# Patient Record
Sex: FEMALE | Race: WHITE | NOT HISPANIC OR LATINO
[De-identification: names, ages, dates, MRNs, and addresses within clinical notes are randomized per-mention and may not be internally consistent; named-entity substitution may affect disease eponyms.]

---

## 2022-02-13 ENCOUNTER — RX ONLY (OUTPATIENT)
Age: 57
Setting detail: RX ONLY
End: 2022-02-13

## 2023-02-13 ENCOUNTER — RX ONLY (OUTPATIENT)
Age: 58
Setting detail: RX ONLY
End: 2023-02-13

## 2023-11-27 ENCOUNTER — APPOINTMENT (RX ONLY)
Dept: URBAN - METROPOLITAN AREA CLINIC 166 | Facility: CLINIC | Age: 58
Setting detail: DERMATOLOGY
End: 2023-11-27

## 2023-11-27 DIAGNOSIS — Z41.9 ENCOUNTER FOR PROCEDURE FOR PURPOSES OTHER THAN REMEDYING HEALTH STATE, UNSPECIFIED: ICD-10-CM

## 2023-11-27 PROCEDURE — ? COSMETIC FOLLOW-UP

## 2023-11-27 NOTE — PROCEDURE: COSMETIC FOLLOW-UP
Detail Level: Zone
Comments (Free Text): Patient presents for Botox and filler f/u. She states her left brow is slightly lower and she may want more medial cheek filler. \\n\\nPlan: \\n- Left anterior aspect of brow is slightly lower than right brow. Patient wishing for more of a brow lift. After discussion. Plan is to wait until her 3 month retreatment. No Botox today. \\n- [place] temples with sculptra in 3 months \\n- Add more filler to medial cheeks. We will not need the entire syringe. We will treat the chin with whatever is left from the cheek filler. \\n\\nPatient agreeable to plan. She also is wanting improvement in skin laxity. Profound microneedling recommended. Info pamphlet given and consultation with  rec.

## 2024-02-13 ENCOUNTER — APPOINTMENT (RX ONLY)
Dept: URBAN - METROPOLITAN AREA CLINIC 166 | Facility: CLINIC | Age: 59
Setting detail: DERMATOLOGY
End: 2024-02-13

## 2024-02-13 DIAGNOSIS — L81.4 OTHER MELANIN HYPERPIGMENTATION: ICD-10-CM

## 2024-02-13 DIAGNOSIS — L57.8 OTHER SKIN CHANGES DUE TO CHRONIC EXPOSURE TO NONIONIZING RADIATION: ICD-10-CM

## 2024-02-13 DIAGNOSIS — Z71.89 OTHER SPECIFIED COUNSELING: ICD-10-CM

## 2024-02-13 DIAGNOSIS — D18.0 HEMANGIOMA: ICD-10-CM

## 2024-02-13 DIAGNOSIS — D22 MELANOCYTIC NEVI: ICD-10-CM

## 2024-02-13 DIAGNOSIS — L82.1 OTHER SEBORRHEIC KERATOSIS: ICD-10-CM

## 2024-02-13 DIAGNOSIS — B35.1 TINEA UNGUIUM: ICD-10-CM | Status: INADEQUATELY CONTROLLED

## 2024-02-13 PROBLEM — D22.5 MELANOCYTIC NEVI OF TRUNK: Status: ACTIVE | Noted: 2024-02-13

## 2024-02-13 PROBLEM — D18.01 HEMANGIOMA OF SKIN AND SUBCUTANEOUS TISSUE: Status: ACTIVE | Noted: 2024-02-13

## 2024-02-13 PROCEDURE — ? PRESCRIPTION MEDICATION MANAGEMENT

## 2024-02-13 PROCEDURE — ? BODY PHOTOGRAPHY

## 2024-02-13 PROCEDURE — ? SUNSCREEN RECOMMENDATIONS

## 2024-02-13 PROCEDURE — ? PRESCRIPTION

## 2024-02-13 PROCEDURE — ? COUNSELING

## 2024-02-13 PROCEDURE — 99214 OFFICE O/P EST MOD 30 MIN: CPT

## 2024-02-13 RX ORDER — KETOCONAZOLE 20 MG/G
CREAM TOPICAL TWICE DAILY
Qty: 60 | Refills: 3 | Status: ERX | COMMUNITY
Start: 2024-02-13

## 2024-02-13 RX ORDER — CICLOPIROX 80 MG/ML
SOLUTION TOPICAL
Qty: 6.6 | Refills: 3 | Status: ERX | COMMUNITY
Start: 2024-02-13

## 2024-02-13 RX ADMIN — CICLOPIROX: 80 SOLUTION TOPICAL at 00:00

## 2024-02-13 RX ADMIN — KETOCONAZOLE: 20 CREAM TOPICAL at 00:00

## 2024-02-13 ASSESSMENT — LOCATION SIMPLE DESCRIPTION DERM
LOCATION SIMPLE: LEFT LOWER BACK
LOCATION SIMPLE: LEFT GREAT TOE
LOCATION SIMPLE: RIGHT GREAT TOE
LOCATION SIMPLE: LEFT UPPER BACK
LOCATION SIMPLE: RIGHT UPPER BACK

## 2024-02-13 ASSESSMENT — LOCATION DETAILED DESCRIPTION DERM
LOCATION DETAILED: RIGHT INFERIOR UPPER BACK
LOCATION DETAILED: RIGHT MEDIAL UPPER BACK
LOCATION DETAILED: LEFT DISTAL PLANTAR GREAT TOE
LOCATION DETAILED: LEFT SUPERIOR LATERAL LOWER BACK
LOCATION DETAILED: LEFT MID-UPPER BACK
LOCATION DETAILED: RIGHT GREAT TOENAIL

## 2024-02-13 ASSESSMENT — LOCATION ZONE DERM
LOCATION ZONE: TOENAIL
LOCATION ZONE: TOE
LOCATION ZONE: TRUNK

## 2024-02-13 NOTE — PROCEDURE: PRESCRIPTION MEDICATION MANAGEMENT
Detail Level: Zone
Render In Strict Bullet Format?: No
Initiate Treatment: Ciclopirox apply to affected nails\\nKetoconazole cream apply to toes daily

## 2024-02-13 NOTE — PROCEDURE: COUNSELING
Detail Level: Generalized
Detail Level: Zone
Nicotinamide Supplementation Recommendations: Heliocare Advanced Daily
Sunscreen Recommendations: Eryfotona Actinica or Eryfotona Ageless can improve appearance of skin and slow/decrease growth or pre-cancerous lesions
Topical Retinoids Recommendations: Pea sized amount of retinoid in evenings (at least 10 mins after washing face) as tolerated
Bleaching Agents Recommendations: Hydroquinone should be used 3 months on 3 months off\\nIf used continually it can cause pigmentation in the skin that will never resolve
Ipl Recommendations: Consult with 
Sunscreen Recommendations: SPF 30+ or higher daily
Chemical Peel Recommendations: VI peel
Skin Checks Recommendations: monthly
Vinegar Soaks Recommendations: white vinegar and original listerine 50/50 mixture soak 10 mins per night

## 2024-02-13 NOTE — PROCEDURE: BODY PHOTOGRAPHY
Whole Body Statement: The whole body was photographed today.
Consent was obtained for whole body photography. Patient understands that photograph costs may not be covered by insurance.
Was The Entire Body Photographed (Cannot Bill Unless Entire Body Photographed)?: No
Detail Level: Generalized
Reason For Photography: The patient is obtaining whole body photography to observe existing suspicious moles and or monitor for the appearance of any new lesions.

## 2024-02-27 ENCOUNTER — APPOINTMENT (RX ONLY)
Dept: URBAN - METROPOLITAN AREA CLINIC 166 | Facility: CLINIC | Age: 59
Setting detail: DERMATOLOGY
End: 2024-02-27

## 2024-02-27 DIAGNOSIS — Z41.9 ENCOUNTER FOR PROCEDURE FOR PURPOSES OTHER THAN REMEDYING HEALTH STATE, UNSPECIFIED: ICD-10-CM

## 2024-02-27 PROCEDURE — ? MICRONEEDLING

## 2024-02-27 ASSESSMENT — LOCATION SIMPLE DESCRIPTION DERM: LOCATION SIMPLE: LEFT INFERIOR EYELID

## 2024-02-27 ASSESSMENT — LOCATION ZONE DERM: LOCATION ZONE: EYELID

## 2024-02-27 ASSESSMENT — LOCATION DETAILED DESCRIPTION DERM: LOCATION DETAILED: LEFT MEDIAL INFERIOR EYELID

## 2024-02-27 NOTE — PROCEDURE: MICRONEEDLING
Consent: Written consent obtained, risks reviewed including but not limited to pain, scarring, infection and incomplete improvement.  Patient understands the procedure is cosmetic in nature and will require out of pocket payment.
Depth In Mm (Location #2): 1
Depth In Mm (Location #6): 0.1
Location #1: cheeks
Topical Anesthesia?: 10% benzocaine, 3% lidocaine, 2% tetracaine, 0.01% phenylephrine
How Many Cc Of Bacteriostatic 0.9% Saline Were Added?: 0
Location #2: forehead nose
Depth In Mm (Location #1): 1.8
Price (Use Numbers Only, No Special Characters Or $): 018
Post-Care Instructions: After the procedure, take precautions agains sun exposure. Do not apply sunscreen for 12 hours after the procedure. Do not apply make-up for 12 hours after the procedure. Avoid alcohol based toners for 10-14 days. After 2-3 days patients can return to their regular skin regimen.
Treatment Number (Optional): 2
Detail Level: Zone

## 2024-03-04 ENCOUNTER — APPOINTMENT (RX ONLY)
Dept: URBAN - METROPOLITAN AREA CLINIC 166 | Facility: CLINIC | Age: 59
Setting detail: DERMATOLOGY
End: 2024-03-04

## 2024-03-04 DIAGNOSIS — Z41.9 ENCOUNTER FOR PROCEDURE FOR PURPOSES OTHER THAN REMEDYING HEALTH STATE, UNSPECIFIED: ICD-10-CM

## 2024-03-04 PROCEDURE — ? FILLERS

## 2024-03-04 PROCEDURE — ? BOTOX (U OR CC)

## 2024-03-04 PROCEDURE — ? INVENTORY

## 2024-03-04 NOTE — PROCEDURE: FILLERS
Brows Filler Volume In Cc: 0
Include Cannula Information In Note?: No
Consent: Written consent obtained. Risks include but not limited to bruising, beading, irregular texture, ulceration, infection, allergic reaction, scar formation, incomplete augmentation, temporary nature, and procedural pain.
Topical Anesthesia?: 23% lidocaine, 7% tetracaine
Lot #: [id number]
Aspiration Statement: Aspiration was performed prior to injecting site with filler.
Post-Care Instructions: After the procedure, patient instructed to apply ice to reduce swelling. Cap refill prior to injections less than 3 seconds. Cap refill post injections  remains less than 3 seconds
Additional Area 1 Location: Perioral Lines
Expiration Date (Month Year): 2024/06/10
Detail Level: Zone
Filler: Juvederm Voluma XC
Lot #: [id number]
Cheeks Filler Volume In Cc: 1
Expiration Date (Month Year): 2024/06/30
Additional Area 1 Location: Chin
Map Statment: See Attach Map for Complete Details
Include Documentation That Aspiration Was Performed Prior To Injecting Filler:: Yes

## 2024-03-04 NOTE — PROCEDURE: BOTOX (U OR CC)
Detail Level: Detailed
Additional Area 1 Units: 0
Additional Area 3 Location: Perioral Lines
Anterior Platysmal Bands Units: 20
Forehead Units/Cc: 15
Lot #: [id number]
Dilution (U/0.1 Cc): 1
Document As Units Or Cc?: units
Depressor Anguli Oris Units: 10
Expiration Date (Month Year): 2025/11
Additional Area 2 Location: [facility]
Consent: Written consent obtained. Risks include but not limited to lid/brow ptosis, bruising, swelling, diplopia, temporary effect, incomplete chemical denervation.
Post-Care Instructions: Patient instructed to not lie down for 4 hours and limit physical activity for 24 hours.
Including Pricing Information In The Note: Yes
Additional Area 1 Location: Lip Flip

## 2024-04-12 ENCOUNTER — APPOINTMENT (RX ONLY)
Dept: URBAN - METROPOLITAN AREA CLINIC 166 | Facility: CLINIC | Age: 59
Setting detail: DERMATOLOGY
End: 2024-04-12

## 2024-04-12 DIAGNOSIS — Z41.9 ENCOUNTER FOR PROCEDURE FOR PURPOSES OTHER THAN REMEDYING HEALTH STATE, UNSPECIFIED: ICD-10-CM

## 2024-04-12 PROCEDURE — ? COSMETIC FOLLOW-UP

## 2024-04-12 NOTE — PROCEDURE: COSMETIC FOLLOW-UP
Comments (Free Text): Patient requesting more lift of her eyebrows. She also complains of asymmetry of her brows. Her baseline photo without treatment vs her photo taken today with treatment was shown to her to explain to  her that her brows are naturally asymmetrical. She was told this can be minimally corrected with neurotoxin but if she would like complete correction she will need to see a plastic surgeon for a brow lift. Patient verbalized understanding and agreed to plan.\\n\\n\\nShe requested treatment options for crepey skin under the eye and improving her skin texture on her face overall. Resurfx laser at the [facility] was suggested. Patient verbalized understanding and agreed to call and book consult with aestheticians.
Detail Level: Zone

## 2024-04-12 NOTE — HPI: COSMETIC FOLLOW UP
What Condition Are We Treating?: Eyebrow arch
What Procedure Did We Perform At The Last Visit?: Neurotoxin

## 2024-05-07 ENCOUNTER — APPOINTMENT (RX ONLY)
Dept: URBAN - METROPOLITAN AREA CLINIC 166 | Facility: CLINIC | Age: 59
Setting detail: DERMATOLOGY
End: 2024-05-07

## 2024-05-07 DIAGNOSIS — Z41.9 ENCOUNTER FOR PROCEDURE FOR PURPOSES OTHER THAN REMEDYING HEALTH STATE, UNSPECIFIED: ICD-10-CM

## 2024-05-07 PROCEDURE — ? MICRONEEDLING

## 2024-05-07 ASSESSMENT — LOCATION SIMPLE DESCRIPTION DERM: LOCATION SIMPLE: LEFT CHEEK

## 2024-05-07 ASSESSMENT — LOCATION ZONE DERM: LOCATION ZONE: FACE

## 2024-05-07 ASSESSMENT — LOCATION DETAILED DESCRIPTION DERM: LOCATION DETAILED: LEFT MEDIAL MALAR CHEEK

## 2024-05-07 NOTE — PROCEDURE: MICRONEEDLING
Depth In Mm (Location #3): 0.1
Depth In Mm (Location #1): 1.8
Post-Care Instructions: After the procedure, take precautions agains sun exposure. Do not apply sunscreen for 12 hours after the procedure. Do not apply make-up for 12 hours after the procedure. Avoid alcohol based toners for 10-14 days. After 2-3 days patients can return to their regular skin regimen.
How Many Cc Of Bacteriostatic 0.9% Saline Were Added?: 0
Price (Use Numbers Only, No Special Characters Or $): 431
Depth In Mm (Location #2): 1
Location #1: cheeks
Consent: Written consent obtained, risks reviewed including but not limited to pain, scarring, infection and incomplete improvement.  Patient understands the procedure is cosmetic in nature and will require out of pocket payment.
Detail Level: Zone
Topical Anesthesia?: 10% benzocaine, 3% lidocaine, 2% tetracaine, 0.01% phenylephrine
Location #2: forehead and nose

## 2024-05-24 ENCOUNTER — APPOINTMENT (RX ONLY)
Dept: URBAN - METROPOLITAN AREA CLINIC 166 | Facility: CLINIC | Age: 59
Setting detail: DERMATOLOGY
End: 2024-05-24

## 2024-05-24 DIAGNOSIS — Z41.9 ENCOUNTER FOR PROCEDURE FOR PURPOSES OTHER THAN REMEDYING HEALTH STATE, UNSPECIFIED: ICD-10-CM

## 2024-05-24 PROCEDURE — ? BOTOX (U OR CC)

## 2024-05-24 PROCEDURE — ? INVENTORY

## 2024-05-24 NOTE — PROCEDURE: BOTOX (U OR CC)
Document As Units Or Cc?: units
Lateral Platysmal Bands Units: 0
Forehead Units/Cc: 15
Consent: Written consent obtained. Risks include but not limited to lid/brow ptosis, bruising, swelling, diplopia, temporary effect, incomplete chemical denervation.
Depressor Anguli Oris Units: 10
Including Pricing Information In The Note: Yes
Additional Area 1 Location: Lip Flip
Glabellar Complex Units: 20
Additional Area 2 Location: [facility]
Additional Area 3 Location: Perioral Lines
Additional Area 4 Location: Nasal Ala
Dilution (U/0.1 Cc): 1
Expiration Date (Month Year): 2026/05
Post-Care Instructions: Patient instructed to not lie down for 4 hours and limit physical activity for 24 hours.
Detail Level: Detailed
Lot #: [id number]

## 2025-01-24 ENCOUNTER — APPOINTMENT (OUTPATIENT)
Dept: URBAN - METROPOLITAN AREA CLINIC 166 | Facility: CLINIC | Age: 60
Setting detail: DERMATOLOGY
End: 2025-01-24

## 2025-01-24 DIAGNOSIS — Z41.9 ENCOUNTER FOR PROCEDURE FOR PURPOSES OTHER THAN REMEDYING HEALTH STATE, UNSPECIFIED: ICD-10-CM

## 2025-01-24 PROCEDURE — ? ADDITIONAL NOTES

## 2025-01-24 NOTE — PROCEDURE: ADDITIONAL NOTES
Render Risk Assessment In Note?: no
Detail Level: Generalized
Additional Notes: Patient presents for Sculptra treatment of face and chest. She states she has a special event tonight. Discussed the risk of bruising and edema post-treatment. Will reschedule patient in February. No charge visit today.

## 2025-03-14 ENCOUNTER — APPOINTMENT (OUTPATIENT)
Dept: URBAN - METROPOLITAN AREA CLINIC 166 | Facility: CLINIC | Age: 60
Setting detail: DERMATOLOGY
End: 2025-03-14

## 2025-03-14 DIAGNOSIS — Z41.9 ENCOUNTER FOR PROCEDURE FOR PURPOSES OTHER THAN REMEDYING HEALTH STATE, UNSPECIFIED: ICD-10-CM

## 2025-03-14 PROCEDURE — ? DYSPORT (U OR CC)

## 2025-03-14 PROCEDURE — ? SCULPTRA

## 2025-03-14 PROCEDURE — ? INVENTORY

## 2025-03-14 NOTE — PROCEDURE: DYSPORT (U OR CC)
Depressor Anguli Oris Units: 0
Additional Area 1 Location: [facility]
Expiration Date (Month Year): 6/30/25
Additional Comments: \\n\\n\\nGlabella- 20\\nForehead- 13\\nEyes-16
Periorbital Skin Units: 48
Forehead Units: 39
Additional Area 2 Location: Lip Flip
Post-Care Instructions: Patient instructed to not lie down for 4 hours and limit physical activity for 24 hours.
Lot #: [id number]
Dilution (U/0.1 Cc): 1
Consent: Written consent obtained. Risks include but not limited to lid/brow ptosis, bruising, swelling, diplopia, temporary effect, incomplete chemical denervation.
Detail Level: Detailed
Glabellar Complex Units: 60

## 2025-03-14 NOTE — PROCEDURE: SCULPTRA
Tear Troughs Filler Volume In Cc: 0
Show Nasolabial Folds Volume?: Yes
Anesthesia Type: 1% lidocaine with epinephrine
Vials Reconstituted (Required For Inventory): 1
Dilution Method: The Sculptra was mixed with 5cc sterile water and shaken vigorously for 2 minutes. 3cc sterile water was add to mixture and the vial was shaken vigorously for 2. minutes. Then, 0.5cc 2% lidocaine and 0.5cc sodium bicarbonate was added. The vial was gently swirled to mix. Then, 9cc of the mixed solution was drawn vial with caution not to draw any foam into the syringe.
Show Inventory Tab: Hide
Mental Crease Filler Volume In Cc: 2
Left Temple Hollow Filler Volume In Cc: 1.5
Lot #: [id number]
Injection Technique: The Sculptra was injected to the listed areas after cleansing the skin  with Vashe and providing appropriate anesthesia with subcutaneous numbing with lidocaine with epinephrine. The sculptra was placed in linear threads via a 25g cannula. Aspiration was performed before every injection. In temple hollows, STA was palpated and marked to avoid. 27g needle was placed on periosteum and aspiration was performed before injection 1cc of Scupltra.
Show Right And Left Nasolabial Fold Volume?: No
Consent: Written consent obtained. Risks include but not limited to bruising, beading, irregular texture, ulceration, infection, allergic reaction, scar formation, incomplete augmentation, temporary nature, procedural pain.
Map Statement: See Attached Map for Complete Details.
Expiration Date (Month Year): 2027/01/31
Post-Care Instructions: Patient educated on 5X5X5 rule. The patient is to massage the treated areas 5 times a day, for 5 minutes, for 5 days. Exercise should be avoided for 24 hours. Patient understanding that 3 treatment session,  6-8 weeks apart, are recommended for optimal results.
Cheeks Filler Volume In Cc: 4
Detail Level: Detailed
Additional Area 1 Location: Stomach
Volumizer: Sculptra

## 2025-06-13 ENCOUNTER — APPOINTMENT (OUTPATIENT)
Dept: URBAN - METROPOLITAN AREA CLINIC 166 | Facility: CLINIC | Age: 60
Setting detail: DERMATOLOGY
End: 2025-06-13

## 2025-06-13 DIAGNOSIS — Z41.9 ENCOUNTER FOR PROCEDURE FOR PURPOSES OTHER THAN REMEDYING HEALTH STATE, UNSPECIFIED: ICD-10-CM

## 2025-06-13 PROCEDURE — ? PLATELET RICH PLASMA INJECTION

## 2025-06-13 PROCEDURE — ? SCULPTRA

## 2025-06-13 PROCEDURE — ? RADIESSE + INJECTION

## 2025-06-13 PROCEDURE — ? INVENTORY

## 2025-06-13 PROCEDURE — ? DYSPORT (U OR CC)

## 2025-06-13 NOTE — PROCEDURE: DYSPORT (U OR CC)
Forehead Units: 13
Depressor Anguli Oris Units: 0
Additional Area 2 Location: Lip Flip
Expiration Date (Month Year): 9/30/2026
Consent: Written consent obtained. Risks include but not limited to lid/brow ptosis, bruising, swelling, diplopia, temporary effect, incomplete chemical denervation.
Additional Area 1 Location: [facility]
Detail Level: Detailed
Periorbital Skin Units: 16
Dilution (U/0.1 Cc): 1
Lot #: [id number]
Post-Care Instructions: Patient instructed to not lie down for 4 hours and limit physical activity for 24 hours.
Glabellar Complex Units: 20

## 2025-06-13 NOTE — PROCEDURE: RADIESSE + INJECTION
Show Inventory Tab: Hide
Include Cannula Information In Note?: Yes
Number Of Syringes (Required For Inventory): 1
[place] Lips Filler Volume In Cc: 0
Detail Level: Detailed
Include Cannula Brand?: DermaSculpt
Expiration Date (Month Year): 2026/06/06
Filler: Radiesse
Additional Area 1 Volume In Cc: 1.5
Procedural Text: Radiasse+ was mixed with 6cc of sterile water with a leur to leur device and mixed at least 20 times. 1.2cc of the mixed radiesse was placed at each port site. 
Anesthesia Type: 1% lidocaine with epinephrine
Consent: Written consent obtained. Risks include but not limited to bruising, beading, irregular texture, ulceration, infection, allergic reaction, scar formation, incomplete augmentation, temporary nature, procedural pain. 
Use Map Statement For Sites (Optional): No
Map Statement: See Attached Map for Complete Details
Include Cannula Length?: 2 inch
Additional Area 1 Location: Neck 
Post-Care Instructions: Patient instructed to apply ice to reduce swelling. 
Include Cannula Size?: 22G
Lot #: [id number]

## 2025-06-13 NOTE — PROCEDURE: PLATELET RICH PLASMA INJECTION
Depth In Mm (Will Not Render If 0): 0
Consent: Written consent obtained, risks reviewed including but not limited to pain, scarring, infection and incomplete improvement.  Patient understands the procedure is cosmetic in nature and will require out of pocket payment.
Anesthesia Type: 1% lidocaine with epinephrine
Number Of Tubes Drawn: 1
Post-Care Instructions: After the procedure, take precautions agains sun exposure. Do not apply sunscreen for 12 hours after the procedure. Do not apply make-up for 12 hours after the procedure. Avoid alcohol based toners for 10-14 days. After 2-3 days patients can return to their regular skin regimen.
Location #1: Right [place]dereye
Comments: Lot # [id number]
Which Technique?: Default
Detail Level: Zone
Standard Default Technique For Making Prp: The patient's left AC was cleaned and prepped with the provided alcohol prep pad in the PRP kit. The provided 21g butterfly needle was used to draw 10cc of blood into provided tube. The tube was then spun down in centrifuge. The  PRP was  removed from the tube by inserting a sterile 18g needle into the stopper of the tube and aspirating the PRP into the syringe.
Amount Of Blood Collected (Optional - Include Units): 15
Who Performed The Venipuncture?: Dorita Caballero NP
Location #2: Left [place]dereye
Venipuncture Paragraph: An alcohol pad was applied to the venipuncture site. Venipuncture was performed using a butterfly needle. Pressure and a bandaid was applied to the site. No complications were noted.
Amount Injected At This Location In Cc (Will Not Render If 0): 2
Site Of Venipuncture?: Left  AC
Show Additional Techniques: Yes
Price (Use Numbers Only, No Special Characters Or $): 500
Anesthesia Volume In Cc: 0.5
Amount Of Blood Processed (Optional - Include Units): 6

## 2025-06-13 NOTE — PROCEDURE: SCULPTRA
Left Jawline Filler Volume In Cc: 0
Anesthesia Type: 1% lidocaine with epinephrine
Show Inventory Tab: Hide
Jawline Sculptra Filler Volume In Cc: 3
Show Right And Left Dorsal Hands Volume?: No
Use Map Statement For Sites (Optional): Yes
Lot #: [id number]
Anesthesia Volume In Cc: 0.8
Post-Care Instructions: Patient educated on 5X5X5 rule. The patient is to massage the treated areas 5 times a day, for 5 minutes, for 5 days. Exercise should be avoided for 24 hours. Patient understanding that 3 treatment session,  6-8 weeks apart, are recommended for optimal results.
Detail Level: Detailed
Map Statement: See Attached Map for Complete Details.
Additional Area 1 Location: Stomach
Left Temple Hollow Filler Volume In Cc: 2
Consent: Written consent obtained. Risks include but not limited to bruising, beading, irregular texture, ulceration, infection, allergic reaction, scar formation, incomplete augmentation, temporary nature, procedural pain.
Injection Technique: The Sculptra was injected to the listed areas after cleansing the skin  with Vashe and providing appropriate anesthesia with subcutaneous numbing with lidocaine with epinephrine. The sculptra was placed in linear threads via a 25g cannula. Aspiration was performed before every injection. In temple hollows, STA was palpated and marked to avoid. 27g needle was placed on periosteum and aspiration was performed before injection 1cc of Scupltra.
Dilution Method: The Sculptra was mixed with 5cc sterile water and shaken vigorously for 2 minutes. 3cc sterile water was add to mixture and the vial was shaken vigorously for 2. minutes. Then, 0.5cc 2% lidocaine and 0.5cc sodium bicarbonate was added. The vial was gently swirled to mix. Then, 9cc of the mixed solution was drawn vial with caution not to draw any foam into the syringe.
Decollete Sculptra Filler Volume In Cc: 7
Volumizer: Sculptra
Expiration Date (Month Year): 2027/01/31

## 2025-07-10 ENCOUNTER — APPOINTMENT (OUTPATIENT)
Dept: URBAN - METROPOLITAN AREA CLINIC 166 | Facility: CLINIC | Age: 60
Setting detail: DERMATOLOGY
End: 2025-07-10

## 2025-07-10 DIAGNOSIS — Z41.9 ENCOUNTER FOR PROCEDURE FOR PURPOSES OTHER THAN REMEDYING HEALTH STATE, UNSPECIFIED: ICD-10-CM

## 2025-07-10 PROCEDURE — ? MICRONEEDLING
